# Patient Record
Sex: FEMALE | Race: AMERICAN INDIAN OR ALASKA NATIVE
[De-identification: names, ages, dates, MRNs, and addresses within clinical notes are randomized per-mention and may not be internally consistent; named-entity substitution may affect disease eponyms.]

---

## 2018-05-20 ENCOUNTER — HOSPITAL ENCOUNTER (EMERGENCY)
Dept: HOSPITAL 42 - ED | Age: 33
Discharge: HOME | End: 2018-05-20
Payer: MEDICAID

## 2018-05-20 VITALS — RESPIRATION RATE: 18 BRPM | TEMPERATURE: 98.1 F | OXYGEN SATURATION: 100 %

## 2018-05-20 VITALS — HEART RATE: 93 BPM | DIASTOLIC BLOOD PRESSURE: 82 MMHG | SYSTOLIC BLOOD PRESSURE: 118 MMHG

## 2018-05-20 VITALS — BODY MASS INDEX: 29.9 KG/M2

## 2018-05-20 DIAGNOSIS — L05.01: Primary | ICD-10-CM

## 2018-05-20 NOTE — ED PDOC
Arrival/HPI





- General


Historian: Patient





<Markus Das - Last Filed: 05/20/18 17:47>





<Adal Long - Last Filed: 05/20/18 20:00>





- General


Time Seen by Provider: 05/20/18 16:50





- History of Present Illness


Narrative History of Present Illness (Text): 





05/20/18 16:52


33 y/o female, no significant pmh, nkda, c/o painful abscess on the lower 

buttock region x 4 days.  Pt. stated that it started off as a small pimple, 

been growing, painful to sit, no fever or chills, no night sweat, no numbness 

or tingling.  (Markus Das)





Past Medical History





- Provider Review


Nursing Documentation Reviewed: Yes





<Markus Das - Last Filed: 05/20/18 17:47>





Family/Social History





- Physician Review


Nursing Documentation Reviewed: Yes


Family/Social History: Unknown Family HX





<Markus Das - Last Filed: 05/20/18 17:47>





Allergies/Home Meds





<Markus Das - Last Filed: 05/20/18 17:47>





<Adal Long - Last Filed: 05/20/18 20:00>


Allergies/Adverse Reactions: 


Allergies





shrimp Allergy (Verified 05/20/18 17:03)


 ITCHING











Review of Systems





- Review of Systems


Constitutional: absent: Fatigue, Fevers


Eyes: absent: Vision Changes


ENT: absent: Hearing Changes


Respiratory: absent: SOB, Cough


Cardiovascular: absent: Chest Pain


Gastrointestinal: absent: Abdominal Pain, Diarrhea, Nausea, Vomiting


Skin: Skin Lesions, Abscess.  absent: Rash, Pruritis, Laceration, Ulcer, 

Cellulitis


Neurological: absent: Headache, Dizziness


Psychiatric: absent: Anxiety, Depression, Suicidal Ideation





<Markus Das - Last Filed: 05/20/18 17:47>





Physical Exam


Vital Signs Reviewed: Yes


Temperature: Afebrile


Blood Pressure: Normal


Pulse: Regular


Respiratory Rate: Normal


Appearance: Positive for: Well-Appearing, Non-Toxic, Comfortable


Pain Distress: Moderate


Mental Status: Positive for: Alert and Oriented X 3





- Systems Exam


Head: Present: Atraumatic, Normocephalic


Pupils: Present: PERRL


Extroacular Muscles: Present: EOMI


Conjunctiva: Present: Normal


Mouth: Present: Moist Mucous Membranes


Neck: Present: Normal Range of Motion


Respiratory/Chest: Present: Clear to Auscultation, Good Air Exchange.  No: 

Respiratory Distress, Accessory Muscle Use


Cardiovascular: Present: Regular Rate and Rhythm, Normal S1, S2.  No: Murmurs


Abdomen: No: Tenderness, Distention, Peritoneal Signs


Back: Present: Normal Inspection


Upper Extremity: Present: Normal Inspection.  No: Cyanosis, Edema


Lower Extremity: Present: Normal Inspection.  No: Edema


Neurological: Present: GCS=15, CN II-XII Intact, Speech Normal, Motor Func 

Grossly Intact, Gait Normal, Memory Normal


Skin: Present: Warm, Dry, Rashes (visible palpable fluctuant abscess noted on 

the pilnoidal region approx. [id number] diameter, no streaking or cellulitis. ), 

Normal Color


Psychiatric: Present: Alert, Oriented x 3, Normal Insight, Normal Concentration





<Markus Das - Last Filed: 05/20/18 17:47>





Vital Signs











  Temp Pulse Resp BP Pulse Ox


 


 05/20/18 18:39   93 H  18  118/82  100


 


 05/20/18 16:59  98.1 F  81  18  121/84  100














Medical Decision Making





<Markus Das - Last Filed: 05/20/18 17:47>





<Adal Long - Last Filed: 05/20/18 20:00>


ED Course and Treatment: 





05/20/18 17:14


-keflex/bactrim/percocet





05/20/18 17:16


-Urine hcg is negative


-Wound laceration


Sensation intact, motor 5/5, wound irrigated with normal saline 500cc, clean 

with Betadine, sterile procedure as usual, 1% lidocaine with 1 mL with local 

infiltration, #11 blade incision, drained approx. 15-20cc of purulant abscess, 

hemostasis obtained, 1/2" iodofoam packing inserted and xerofoam gauze dressing

, sensation intact, motor 5/5, minimal blood loss less than 5cc, pt. tolerated 

the procedure well with no complication. Pain decreased and pt. feel much 

better. Total procedure time 25 minutes





05/20/18 17:49


-Laceration wound discharge


-Discharge home with keflex, bactrim ds, motrin, education on to keep the 

dressing and wound clean and dry for 2 days then return to the ER in 2 days for 

wound check/packing/dressing change, follow up with your own primary care 

doctors and specialist within 3 days, return to ER for any concerning/worsening 

signs or symptoms (Markus Das)





- Medication Orders


Current Medication Orders: 














Discontinued Medications





Cephalexin Monohydrate (Keflex)  500 mg PO STAT STA


   PRN Reason: Protocol


   Stop: 05/20/18 17:21


   Last Admin: 05/20/18 17:32  Dose: 500 mg





Oxycodone/Acetaminophen (Percocet 5/325 Mg Tab)  1 tab PO STAT STA


   Stop: 05/20/18 17:21


   Last Admin: 05/20/18 17:31  Dose: 1 tab





MAR Pain Assessment


 Document     05/20/18 17:31  EQ  (Rec: 05/20/18 17:31  EQ  [id number])


     Pain Reassessment


      Is this a pain reassessment?               No


     Sleep


      Is patient sleeping during reassessment?   No


     Presence of Pain


      Presence of Pain                           Yes





Trimethoprim/Sulfamethoxazole (Bactrim Ds Tab)  1 tab PO STAT STA


   PRN Reason: Protocol


   Stop: 05/20/18 17:21


   Last Admin: 05/20/18 17:31  Dose: 1 tab











- PA / NP / Resident Statement


MD/ has reviewed & agrees with the documentation as recorded.





<Markus Das - Last Filed: 05/20/18 17:47>





- PA / NP / Resident Statement


MD/ has reviewed & agrees with the documentation as recorded.





<Adal Long - Last Filed: 05/20/18 20:00>





Disposition/Present on Arrival





- Present on Arrival


Any Indicators Present on Arrival: No


History of DVT/PE: No


History of Uncontrolled Diabetes: No


Urinary Catheter: No


History of Decub. Ulcer: No





- Disposition


Have Diagnosis and Disposition been Completed?: Yes


Disposition Time: 17:18


Patient Plan: Discharge





<Markus Das - Last Filed: 05/20/18 17:47>





<Adal Long - Last Filed: 05/20/18 20:00>





- Disposition


Diagnosis: 


 Abscess





Disposition: HOME/ ROUTINE


Condition: IMPROVED


Additional Instructions: 


-Discharge home with keflex, bactrim ds, motrin, education on to keep the 

dressing and wound clean and dry for 2 days then return to the ER in 2 days for 

wound check/packing/dressing change, follow up with your own primary care 

doctors and specialist within 3 days, return to ER for any concerning/worsening 

signs or symptoms


Prescriptions: 


Cephalexin [Keflex] 500 mg PO QID #40 capsule


Ibuprofen [Motrin Tab] 600 mg PO QID PRN #30 tab


 PRN Reason: Other


Sulfamethoxazole/Trimethoprim [Bactrim Ds Tablet] 1 each PO BID #20 tablet


Referrals: 


Tucker Martinez MD [Medical Doctor] - Follow up with primary


[facility] [place] [Outside] - Follow up with primary


Forms:  WORK NOTE

## 2018-05-22 ENCOUNTER — HOSPITAL ENCOUNTER (EMERGENCY)
Dept: HOSPITAL 42 - ED | Age: 33
Discharge: HOME | End: 2018-05-22
Payer: MEDICAID

## 2018-05-22 VITALS — TEMPERATURE: 98.7 F | OXYGEN SATURATION: 100 %

## 2018-05-22 VITALS — DIASTOLIC BLOOD PRESSURE: 68 MMHG | SYSTOLIC BLOOD PRESSURE: 120 MMHG | HEART RATE: 70 BPM | RESPIRATION RATE: 16 BRPM

## 2018-05-22 VITALS — BODY MASS INDEX: 29.9 KG/M2

## 2018-05-22 DIAGNOSIS — Z48.89: ICD-10-CM

## 2018-05-22 DIAGNOSIS — L02.31: Primary | ICD-10-CM

## 2018-05-22 NOTE — ED PDOC
Arrival/HPI





- General


Chief Complaint: Wound Check


Time Seen by Provider: 05/22/18 12:30


Historian: Patient





- History of Present Illness


Narrative History of Present Illness (Text): 





05/22/18 14:46


32-year-old female presents today for wound check of an abscess to the 

buttocks. Patient states she was seen in the emergency room 2 days ago and had 

incision and drainage. Patient states she is feeling better. Patient states 

taking antibiotics as prescribed. Patient denies fevers or chills. Patient 

complaining of minimal pain to the abscess site. No other complaints





Past Medical History





- Provider Review


Nursing Documentation Reviewed: Yes





- Travel History


Have you recently traveled outside [place] w/in the past 3 mons?: No





- Infectious Disease


Hx of Infectious Diseases: None





- Reproductive


Menopause: No





- Pulmonary


Hx Respiratory Disorders: No





- Neurological


Hx Neurological Disorder: No





- HEENT


Hx HEENT Disorder: No





- Hematological/Oncological


Hx Blood Disorders: No





- Integumentary


Hx Eczema: Yes





- Psychiatric


Hx Substance Use: Yes (marijuana)





- Anesthesia


Hx Anesthesia: No





Family/Social History





- Physician Review


Nursing Documentation Reviewed: Yes


Family/Social History: Unknown Family HX


Smoking Status: Light Smoker < 10 Cigarettes Daily


Hx Alcohol Use: Yes (weekly)


Hx Substance Use: Yes (marijuana)





Allergies/Home Meds


Allergies/Adverse Reactions: 


Allergies





shrimp Allergy (Verified 05/20/18 17:03)


 ITCHING











Review of Systems





- Review of Systems


Constitutional: absent: Fatigue, Fevers


Respiratory: absent: SOB, Cough


Cardiovascular: absent: Chest Pain, Palpitations


Gastrointestinal: absent: Abdominal Pain, Nausea, Vomiting


Skin: Abscess


Neurological: absent: Headache, Dizziness


Psychiatric: absent: Anxiety, Depression





Physical Exam


Vital Signs Reviewed: Yes


Vital Signs











  Temp Pulse Resp BP Pulse Ox


 


 05/22/18 12:59   70  16  120/68 


 


 05/22/18 11:43  98.7 F  76  18  101/71  100











Temperature: Afebrile


Blood Pressure: Normal


Pulse: Regular


Respiratory Rate: Normal


Appearance: Positive for: Well-Appearing, Non-Toxic, Comfortable


Pain Distress: None


Mental Status: Positive for: Alert and Oriented X 3





- Systems Exam


Head: Present: Atraumatic


Respiratory/Chest: Present: Clear to Auscultation


Cardiovascular: Present: Regular Rate and Rhythm


Upper Extremity: Present: Normal ROM


Neurological: Present: GCS=15, Speech Normal


Skin: Present: Warm, Dry, Normal Color, Abscess (buttock crease; there is 

packing in placed; + induration, minimal tenderness; no purulent discharge. )


Psychiatric: Present: Alert, Oriented x 3





Medical Decision Making


ED Course and Treatment: 





05/22/18 


Patient is nontoxic well-appearing in no distress. Vital signs are stable.





packing removed from buttock abscess; no additional purulent discharge noted; 

dressing placed. 








Advised patient to continue antibiotics as prescribed and follow-up with the 

surgeon within the next 2 days. Advised immediate return if symptoms worsen 

persist or if new concerning symptoms develop. I've advised the patient to do 

warm compresses frequently.





Patient verbalizes understanding of discharge instructions and need for 

immediate followup.








all aspects of this case were discussed the attending of record. 





impression; abscess, wound check.


continue medications as prescribed


f/u with pmd and surgeon


warm compresses frequently


Return immediately if symptoms worsen persist or if new symptoms develop: High 

fevers, increasing pain, increasing redness, swelling or if any other 

concerning symptoms develop.





Disposition/Present on Arrival





- Present on Arrival


Any Indicators Present on Arrival: No


History of DVT/PE: No


History of Uncontrolled Diabetes: No


Urinary Catheter: No


History of Decub. Ulcer: No


History Surgical Site Infection Following: None





- Disposition


Have Diagnosis and Disposition been Completed?: Yes


Diagnosis: 


 Abscess of buttock, Visit for wound check





Disposition: HOME/ ROUTINE


Disposition Time: 12:30


Patient Plan: Discharge


Condition: GOOD


Discharge Instructions (ExitCare):  Boil (DC)


Additional Instructions: 


Continue antibiotics as prescribed


Warm compresses and warm soaks frequently


Follow-up with the surgeon within the next 2 days


Follow the primary care physician within the next 2 days


Return immediately if symptoms worsen persist or if new symptoms develop: High 

fevers, increasing pain, increasing redness, swelling or if any other 

concerning symptoms develop.


Referrals: 


Froy Bender MD [Staff Provider] - Follow up with primary


Monster Vazquez MD [Staff Provider] - Follow up with primary


Forms:  [employer] (English), WORK NOTE